# Patient Record
Sex: FEMALE | Race: WHITE | NOT HISPANIC OR LATINO | Employment: FULL TIME | URBAN - METROPOLITAN AREA
[De-identification: names, ages, dates, MRNs, and addresses within clinical notes are randomized per-mention and may not be internally consistent; named-entity substitution may affect disease eponyms.]

---

## 2023-09-19 PROBLEM — E66.9 OBESITY: Status: ACTIVE | Noted: 2023-09-19

## 2023-09-19 PROBLEM — M72.2 PLANTAR FASCIITIS, RIGHT: Status: ACTIVE | Noted: 2023-09-19

## 2023-09-19 PROBLEM — M79.671 MECHANICAL PAIN OF RIGHT FOOT: Status: ACTIVE | Noted: 2023-09-19

## 2023-09-19 PROBLEM — F41.9 ANXIETY: Status: ACTIVE | Noted: 2023-09-19

## 2023-09-20 ENCOUNTER — OFFICE VISIT (OUTPATIENT)
Dept: PRIMARY CARE | Facility: CLINIC | Age: 48
End: 2023-09-20
Payer: COMMERCIAL

## 2023-09-20 VITALS
RESPIRATION RATE: 16 BRPM | WEIGHT: 196.8 LBS | BODY MASS INDEX: 34.96 KG/M2 | SYSTOLIC BLOOD PRESSURE: 130 MMHG | OXYGEN SATURATION: 98 % | TEMPERATURE: 97.5 F | HEART RATE: 69 BPM | DIASTOLIC BLOOD PRESSURE: 80 MMHG

## 2023-09-20 DIAGNOSIS — Z00.00 HEALTHCARE MAINTENANCE: ICD-10-CM

## 2023-09-20 DIAGNOSIS — F41.9 ANXIETY: Primary | ICD-10-CM

## 2023-09-20 DIAGNOSIS — Z00.00 HEALTH MAINTENANCE EXAMINATION: ICD-10-CM

## 2023-09-20 PROCEDURE — 99213 OFFICE O/P EST LOW 20 MIN: CPT | Performed by: INTERNAL MEDICINE

## 2023-09-20 PROCEDURE — 1036F TOBACCO NON-USER: CPT | Performed by: INTERNAL MEDICINE

## 2023-09-20 RX ORDER — TALC
POWDER (GRAM) TOPICAL
COMMUNITY

## 2023-09-20 RX ORDER — CHOLECALCIFEROL (VITAMIN D3) 50 MCG
TABLET ORAL
COMMUNITY
Start: 2019-08-12

## 2023-09-20 RX ORDER — CALCIUM CARBONATE/VITAMIN D3 500-10/5ML
LIQUID (ML) ORAL
COMMUNITY
Start: 2019-08-12

## 2023-09-20 RX ORDER — HYDROXYZINE HYDROCHLORIDE 10 MG/1
TABLET, FILM COATED ORAL
Qty: 30 TABLET | Refills: 0 | Status: SHIPPED | OUTPATIENT
Start: 2023-09-20 | End: 2023-10-12 | Stop reason: SDUPTHER

## 2023-09-20 RX ORDER — BUPROPION HYDROCHLORIDE 150 MG/1
150 TABLET ORAL EVERY MORNING
Qty: 30 TABLET | Refills: 1 | Status: SHIPPED | OUTPATIENT
Start: 2023-09-20 | End: 2023-11-17 | Stop reason: SDUPTHER

## 2023-09-20 ASSESSMENT — PATIENT HEALTH QUESTIONNAIRE - PHQ9
1. LITTLE INTEREST OR PLEASURE IN DOING THINGS: NOT AT ALL
10. IF YOU CHECKED OFF ANY PROBLEMS, HOW DIFFICULT HAVE THESE PROBLEMS MADE IT FOR YOU TO DO YOUR WORK, TAKE CARE OF THINGS AT HOME, OR GET ALONG WITH OTHER PEOPLE: NOT DIFFICULT AT ALL
2. FEELING DOWN, DEPRESSED OR HOPELESS: SEVERAL DAYS
SUM OF ALL RESPONSES TO PHQ9 QUESTIONS 1 AND 2: 1

## 2023-09-20 ASSESSMENT — ENCOUNTER SYMPTOMS
PALPITATIONS: 0
ACTIVITY CHANGE: 0
COUGH: 0
APPETITE CHANGE: 0
SHORTNESS OF BREATH: 0

## 2023-09-20 NOTE — PROGRESS NOTES
"The patient is here today for anxiety.  The patient feels like she also has depression also and would like to discuss possibly starting Wellbutrin. The patient also has trouble staying asleep at night.  The patient is taking L-Theanine.  The patient declined to have the flu vaccine.Subjective   Patient ID: Mayra Tillman is a 48 y.o. female who presents for Anxiety.  Here with c/o anxiety    Stopped her citalopram because of decreased libido and \"not feeling right\"    Daughter diagnosed with OCD     Not sleeping - could not tolerate Trazodone  Can fall asleep but cannot stay asleep  Notfocusing  Gets to work no problem - a little more scattered   Tried OTC supplements  Some \"feeling down\"  Wants to try Wellbutrin'    Went to counseling in the past    Alcohol -  1-2 occassional  Marijuana - denies        Review of Systems   Constitutional:  Negative for activity change and appetite change.   Respiratory:  Negative for cough and shortness of breath.    Cardiovascular:  Negative for chest pain, palpitations and leg swelling.       Objective   Physical Exam  Vitals and nursing note reviewed.   Cardiovascular:      Rate and Rhythm: Normal rate and regular rhythm.   Pulmonary:      Effort: Pulmonary effort is normal.      Breath sounds: Normal breath sounds.         Assessment/Plan   Problem List Items Addressed This Visit       Anxiety - Primary    Current Assessment & Plan     Will try Wellbutrin XL 150mg daily and Hydorxyzine at hs         Relevant Medications    buPROPion XL (Wellbutrin XL) 150 mg 24 hr tablet    hydrOXYzine HCL (Atarax) 10 mg tablet    Other Relevant Orders    Follow Up In [facility]    Follow up with me in 4-6 weeks  "

## 2023-10-05 ENCOUNTER — APPOINTMENT (OUTPATIENT)
Dept: PRIMARY CARE | Facility: CLINIC | Age: 48
End: 2023-10-05
Payer: COMMERCIAL

## 2023-10-05 ENCOUNTER — SOCIAL WORK (OUTPATIENT)
Dept: PRIMARY CARE | Facility: CLINIC | Age: 48
End: 2023-10-05
Payer: COMMERCIAL

## 2023-10-05 DIAGNOSIS — F41.9 ANXIETY: ICD-10-CM
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
## 2023-10-10 ASSESSMENT — PATIENT HEALTH QUESTIONNAIRE - PHQ9
7. TROUBLE CONCENTRATING ON THINGS, SUCH AS READING THE NEWSPAPER OR WATCHING TELEVISION: MORE THAN HALF THE DAYS
4. FEELING TIRED OR HAVING LITTLE ENERGY: SEVERAL DAYS
5. POOR APPETITE OR OVEREATING: NOT AT ALL
SUM OF ALL RESPONSES TO PHQ9 QUESTIONS 1 & 2: 1
8. MOVING OR SPEAKING SO SLOWLY THAT OTHER PEOPLE COULD HAVE NOTICED. OR THE OPPOSITE, BEING SO FIGETY OR RESTLESS THAT YOU HAVE BEEN MOVING AROUND A LOT MORE THAN USUAL: NOT AT ALL
10. IF YOU CHECKED OFF ANY PROBLEMS, HOW DIFFICULT HAVE THESE PROBLEMS MADE IT FOR YOU TO DO YOUR WORK, TAKE CARE OF THINGS AT HOME, OR GET ALONG WITH OTHER PEOPLE: NOT DIFFICULT AT ALL
2. FEELING DOWN, DEPRESSED OR HOPELESS: SEVERAL DAYS
3. TROUBLE FALLING OR STAYING ASLEEP: NEARLY EVERY DAY
9. THOUGHTS THAT YOU WOULD BE BETTER OFF DEAD, OR OF HURTING YOURSELF: NOT AT ALL
SUM OF ALL RESPONSES TO PHQ QUESTIONS 1-9: 8
1. LITTLE INTEREST OR PLEASURE IN DOING THINGS: NOT AT ALL
6. FEELING BAD ABOUT YOURSELF - OR THAT YOU ARE A FAILURE OR HAVE LET YOURSELF OR YOUR FAMILY DOWN: SEVERAL DAYS

## 2023-10-10 ASSESSMENT — ANXIETY QUESTIONNAIRES
6. BECOMING EASILY ANNOYED OR IRRITABLE: SEVERAL DAYS
4. TROUBLE RELAXING: NOT AT ALL
GAD7 TOTAL SCORE: 6
2. NOT BEING ABLE TO STOP OR CONTROL WORRYING: SEVERAL DAYS
7. FEELING AFRAID AS IF SOMETHING AWFUL MIGHT HAPPEN: SEVERAL DAYS
5. BEING SO RESTLESS THAT IT IS HARD TO SIT STILL: NOT AT ALL
3. WORRYING TOO MUCH ABOUT DIFFERENT THINGS: SEVERAL DAYS
1. FEELING NERVOUS, ANXIOUS, OR ON EDGE: MORE THAN HALF THE DAYS
IF YOU CHECKED OFF ANY PROBLEMS ON THIS QUESTIONNAIRE, HOW DIFFICULT HAVE THESE PROBLEMS MADE IT FOR YOU TO DO YOUR WORK, TAKE CARE OF THINGS AT HOME, OR GET ALONG WITH OTHER PEOPLE: SOMEWHAT DIFFICULT

## 2023-10-12 ENCOUNTER — DOCUMENTATION (OUTPATIENT)
Dept: BEHAVIORAL HEALTH | Facility: CLINIC | Age: 48
End: 2023-10-12

## 2023-10-12 ENCOUNTER — SOCIAL WORK (OUTPATIENT)
Dept: PRIMARY CARE | Facility: CLINIC | Age: 48
End: 2023-10-12
Payer: COMMERCIAL

## 2023-10-12 DIAGNOSIS — F41.9 ANXIETY: ICD-10-CM

## 2023-10-12 PROBLEM — F41.1 GENERALIZED ANXIETY DISORDER: Status: ACTIVE | Noted: 2023-10-12

## 2023-10-12 RX ORDER — HYDROXYZINE HYDROCHLORIDE 10 MG/1
TABLET, FILM COATED ORAL
Qty: 60 TABLET | Refills: 1 | Status: SHIPPED | OUTPATIENT
Start: 2023-10-12 | End: 2023-11-20 | Stop reason: SDUPTHER

## 2023-10-12 NOTE — TELEPHONE ENCOUNTER
The patient was requesting a refill on the Hyrdroxyzine 10 mg 2 at bedtime. The patient states that she messaged you and you were ok with increasing the dose to two at bedtime. This was entered for approval

## 2023-10-12 NOTE — PROGRESS NOTES
"[facility] Psychiatry Consult Note     Mayra Tillman is a 48 y.o., referred to Collaborative Care for symptoms of depression and anxiety. I have reviewed the patient with the behavioral health manager and reviewed the patient's electronic record.    Recommendations:   - agree with Wellbutrin, which is helpful for mood consequences of anxiety - no need to titrate further  - For more specific treatment of anxiety itself, I still recommend SSRI/SNRI with temporary (2-4 week) augmentation with clonazepam (to prevent initial, short-term adverse effects of increased anxiety, as occurred with sertraline). Could try this with sertraline again, or switch to an SNRI such as duloxetine.  - clonazepam dose would be 0.25-0.5 mg bid for 2-4 weeks, no taper needed with that duration  - may titrate hydroxyzine up to 50 mg if desired for more help with insomnia (don't titrate at the same time as the clonazepam treatment, though)  - [facility] will do mindfulness & exposures for anxiety  - M will help with insomnia: sleep hygiene, behavioral techniques, relaxation, mindfulness    Diagnoses:   Generalized Anxiety Disorder  Persistent Depressive Disorder 2/2 anxiety  Insomnia due to anxiety    Wellbutrin  mg daily helped improve mood, fatigue, focus/impulsivity, and irritability  Hydroxyzine 20 mg nightly for insomnia - partial improvement; joint pain also interferes  Past meds:  Citalopram 30 mg in the past unhelpful, \"didn't feel right,\" sexual Aes  Trazodone was associated with severe joint pain and swelling  Clonazepam - helpful for anxiety, no AEs  Zoloft 50 mg for 1 week caused panic attacks    Patient Health Questionnaire-9 Score: 8 (10/10/2023 12:21 PM)  BOBBY-7 Total Score: 6 (10/10/2023 12:22 PM)    Sertraline (Zoloft)  DOSING INFORMATION:   Consider BMP for baseline sodium in older adults.   Start at 25 mg daily. If well-tolerated after 1 week, increase to 50 mg daily.  Titrate to effect by 25-50 mg every two weeks, to a " maximum dose of 200 mg daily.  Discontinuation: 25% per week to 25% per month depending on length of treatment in order to minimize withdrawal symptoms and relapse.  OF NOTE: False-positive urine immunoassay screening tests for benzodiazepines have been reported in patients taking sertraline.  GENERAL INFORMATION:   Mechanism of Action: Selective serotonin reuptake inhibitor.   FDA Indications: MDD, OCD, panic disorder, PTSD, social phobia, PMDD.   Off-Label Indications: Other anxiety.   Pharmacokinetics: T½ = 26 hrs.   Common Side effects (MDD): Nausea (26%), diarrhea (18%), dry mouth (16%), insomnia (16%), somnolence (13%), dizziness (12%), tremor (11%), fatigue (11%), increased sweating, (8%), ejaculation failure (7%).   Reproductive potential/pregnancy/lactation: Usual first-line SSRI antidepressant during pregnancy and lactation.  Black Box Warning: Increased SI in patients < 26 y/o. This is based on initial pharmaceutical studies and has not been borne out in subsequent meta-analyses.   Rare/Serious Adverse Effects: Clinical worsening and suicide risk, hypomanic/manic switch, serotonin symptoms, weight loss, seizure, discontinuation symptoms, abnormal bleeding, altered platelet function, hyponatremia, weak uricosuric effect, angle closure glaucoma.   Contraindications: Use of a MAOI within 4 weeks; Concomitant use with pimozide.     Duloxetine (Cymbalta)  ==================  DOSING INFORMATION  Week 1: Baseline weight and blood pressure. BMP for baseline sodium in older adults. Start: 30 mg qday.  Week 2: Increase dose to the Initial and Typical Target Dose of 60 mg qday or 30 mg bid, if tolerated.   Max Dose: 120 mg qday (little evidence that higher doses are beneficial).   Discontinuation: 25% per week to 25% per month depending on length of treatment in order to minimize withdrawal symptoms and relapse.  MONITORING: Blood pressure, weight. Posttreatment BMP to rule out hyponatremia in older adults.  GENERAL  INFORMATION:   Mechanism of Action: Serotonin/Norepinephrine Reuptake Inhibitor (SNRI).   FDA Indications: MDD, BOBBY, diabetic peripheral neuropathic pain, fibromyalgia; chronic musculoskeletal pain.   Off-Label Indications: Second-line ADHD, other pain, other anxiety.  Pharmacokinetics: T½ = 12 hrs.   Common Side effects (MDD & BOBBY): nausea (25%), dry mouth (15%), diarrhea (10%), constipation (10%), fatigue (10%), dizziness (10%), somnolence (10%), insomnia (10%), decreased appetite (7%), hyperhidrosis (6%), vomiting (5%), agitation (5%).  Black Box Warning: Increased SI in patients < 24 y/o.   Contraindications:Use of a MAOI within 14 days of stopping Cymbalta, concurrent use of a MAOI including drugs with significant MAOI activity (e.g., linezolid), use of Cymbalta within 14 days of stopping a MAOI, use in patients with uncontrolled narrow angle glaucoma. Warnings and Precautions: Suicidality, hepatotoxicity (should not be prescribed in patients with substantial alcohol use or evidence of chronic liver disease), orthostatic hypotension and syncope, serotonin syndrome, abnormal bleeding, severe skin reactions, discontinuation symptoms, manic switch, seizures, increased BP, use with 1A2 inhibitors or thioridazine, hyponatremia, hepatic insufficiency and severe renal impairment, use caution in patient with controlled narrow-angle glaucoma and with slow gastric emptying, elevation in fasting blood glucose and HbA1C, urinary hesitance and retention. Metabolism/ Pharmacogenomics: Metabolized by 1A2 and 2D6.   Significant drug-drug interactions: 2D6 inhibitor. Avoid co-administration with potent 1A2 inhibitors (e.g., fluvoxamine); and use cautiously with 2D6 inhibitors (e.g., fluoxetine and paroxetine). Potential for abnormal bleeding with NSAIDs or anticoagulants; Check all drug-drug interactions before prescribing.   Dosage Form: Capsule (Do not cut, crush or chew).      The above treatment considerations and  suggestions are based on consultations with the patient's care manager and a review of information available in the electronic medical record. I have not personally examined the patient. All recommendations should be implemented with consideration of the patient's relevant prior history and current clinical status. Please feel free to call me with any questions about the care of this patient. I can easily be reached through State chat.

## 2023-10-13 NOTE — PROGRESS NOTES
[facility]  Progress Note    Type of Interaction: Virtual    Start Time: 12:00 PM    End Time: 12:52 PM      Appointment: Scheduled    Reason for Visit:   Chief Complaint   Patient presents with    Anxiety    Follow-up       Interval History / Patient Symptoms:     Patient Health Questionnaire-9 Score: 8 (10/10/2023 12:21 PM)  BOBBY-7 Total Score: 6 (10/10/2023 12:22 PM)    [facility] met with pt for scheduled virtual session. Pt reported she has been sleeping somewhat better since starting Wellbutrin and Hydroxyzine. Pt reported she takes Hydroxyzine nightly to help her sleep and has been getting between 4-6 hours per night. BHM and pt discussed proper sleep hygiene techniques. Pt reported Wellbutrin has been helpful for managing mood irritability and impulse control. Pt processed negative thoughts impacting her behavior. [facility] provided pt coping strategies for managing irritability.     Interventions Provided: Psychoeducation and Develop Coping Strategies      Progress Made: Minimum    Response to Intervention: Pt was engaged throughout session and was receptive towards [facility] feedback and interventions provided.     Plan:     -Pt will utilize coping strategies learned in session in her daily routine  -Pt will utilize sleep hygiene tips reviewed in session in her daily routine  -Follow up with [facility] in 2 weeks.    Follow Up / Next Appointment: Next appointment: 10/27/23

## 2023-10-27 ENCOUNTER — SOCIAL WORK (OUTPATIENT)
Dept: PRIMARY CARE | Facility: CLINIC | Age: 48
End: 2023-10-27
Payer: COMMERCIAL

## 2023-10-27 ENCOUNTER — APPOINTMENT (OUTPATIENT)
Dept: PRIMARY CARE | Facility: CLINIC | Age: 48
End: 2023-10-27
Payer: COMMERCIAL

## 2023-10-27 ASSESSMENT — PATIENT HEALTH QUESTIONNAIRE - PHQ9
3. TROUBLE FALLING OR STAYING ASLEEP: SEVERAL DAYS
8. MOVING OR SPEAKING SO SLOWLY THAT OTHER PEOPLE COULD HAVE NOTICED. OR THE OPPOSITE, BEING SO FIGETY OR RESTLESS THAT YOU HAVE BEEN MOVING AROUND A LOT MORE THAN USUAL: NOT AT ALL
7. TROUBLE CONCENTRATING ON THINGS, SUCH AS READING THE NEWSPAPER OR WATCHING TELEVISION: NOT AT ALL
9. THOUGHTS THAT YOU WOULD BE BETTER OFF DEAD, OR OF HURTING YOURSELF: NOT AT ALL
SUM OF ALL RESPONSES TO PHQ QUESTIONS 1-9: 2
SUM OF ALL RESPONSES TO PHQ9 QUESTIONS 1 & 2: 0
10. IF YOU CHECKED OFF ANY PROBLEMS, HOW DIFFICULT HAVE THESE PROBLEMS MADE IT FOR YOU TO DO YOUR WORK, TAKE CARE OF THINGS AT HOME, OR GET ALONG WITH OTHER PEOPLE: NOT DIFFICULT AT ALL
6. FEELING BAD ABOUT YOURSELF - OR THAT YOU ARE A FAILURE OR HAVE LET YOURSELF OR YOUR FAMILY DOWN: NOT AT ALL
5. POOR APPETITE OR OVEREATING: NOT AT ALL
4. FEELING TIRED OR HAVING LITTLE ENERGY: SEVERAL DAYS
1. LITTLE INTEREST OR PLEASURE IN DOING THINGS: NOT AT ALL
2. FEELING DOWN, DEPRESSED OR HOPELESS: NOT AT ALL

## 2023-10-27 ASSESSMENT — ANXIETY QUESTIONNAIRES
5. BEING SO RESTLESS THAT IT IS HARD TO SIT STILL: NOT AT ALL
3. WORRYING TOO MUCH ABOUT DIFFERENT THINGS: NOT AT ALL
7. FEELING AFRAID AS IF SOMETHING AWFUL MIGHT HAPPEN: NOT AT ALL
IF YOU CHECKED OFF ANY PROBLEMS ON THIS QUESTIONNAIRE, HOW DIFFICULT HAVE THESE PROBLEMS MADE IT FOR YOU TO DO YOUR WORK, TAKE CARE OF THINGS AT HOME, OR GET ALONG WITH OTHER PEOPLE: NOT DIFFICULT AT ALL
2. NOT BEING ABLE TO STOP OR CONTROL WORRYING: SEVERAL DAYS
4. TROUBLE RELAXING: NOT AT ALL
1. FEELING NERVOUS, ANXIOUS, OR ON EDGE: SEVERAL DAYS
6. BECOMING EASILY ANNOYED OR IRRITABLE: NOT AT ALL
GAD7 TOTAL SCORE: 2

## 2023-10-27 NOTE — PROGRESS NOTES
Collaborative Care ([facility])  Progress Note    Type of Interaction: Virtual    Start Time: 12:06 PM    End Time: 1:01 PM      Appointment: Scheduled    Reason for Visit:   Chief Complaint   Patient presents with    Anxiety    Follow-up       Interval History / Patient Symptoms:     Patient Health Questionnaire-9 Score: 4 (10/27/2023  3:12 PM)  BOBBY-7 Total Score: 5 (10/27/2023  3:12 PM)    [facility] met with pt for scheduled virtual session. Pt reported improvement with anxiety symptoms. Pt reported her sleep has improved as well. Pt reported she has had 2 nights within the past 2 weeks where she woke up several times throughout the night. Pt reported work is busier lately and she has had more responsibilities. Pt processed challenges regarding her children and negative thoughts she experiences about their future, etc. [facility] provided supportive psychotherapy and feedback. [facility] engaged pt in mindfulness techniques progressive muscle relaxation and body scanning to teach pt effective coping strategies for managing stress/ anxiety.     Interventions Provided: Psychoeducation, Develop Coping Strategies, and Mindfulness      Progress Made: Minimum    Response to Intervention: Pt was engaged throughout session and was receptive towards [facility] feedback and interventions provided.     Plan:     -Pt will utilize coping strategies learned in session in her daily routine  -Follow up with [facility] in 3 weeks    Follow Up / Next Appointment: Next appointment: 11/17/23

## 2023-11-03 ENCOUNTER — DOCUMENTATION (OUTPATIENT)
Dept: PRIMARY CARE | Facility: CLINIC | Age: 48
End: 2023-11-03
Payer: COMMERCIAL

## 2023-11-03 DIAGNOSIS — F41.1 GENERALIZED ANXIETY DISORDER: Primary | ICD-10-CM

## 2023-11-03 PROCEDURE — 99492 1ST PSYC COLLAB CARE MGMT: CPT | Performed by: INTERNAL MEDICINE

## 2023-11-03 PROCEDURE — 99494 1ST/SBSQ PSYC COLLAB CARE: CPT | Performed by: INTERNAL MEDICINE

## 2023-11-06 ENCOUNTER — OFFICE VISIT (OUTPATIENT)
Dept: PRIMARY CARE | Facility: CLINIC | Age: 48
End: 2023-11-06
Payer: COMMERCIAL

## 2023-11-06 VITALS
BODY MASS INDEX: 34.94 KG/M2 | SYSTOLIC BLOOD PRESSURE: 120 MMHG | RESPIRATION RATE: 16 BRPM | WEIGHT: 197.2 LBS | HEART RATE: 76 BPM | OXYGEN SATURATION: 97 % | TEMPERATURE: 96.8 F | HEIGHT: 63 IN | DIASTOLIC BLOOD PRESSURE: 86 MMHG

## 2023-11-06 DIAGNOSIS — E66.09 CLASS 1 OBESITY DUE TO EXCESS CALORIES WITHOUT SERIOUS COMORBIDITY WITH BODY MASS INDEX (BMI) OF 34.0 TO 34.9 IN ADULT: ICD-10-CM

## 2023-11-06 DIAGNOSIS — Z12.31 ENCOUNTER FOR SCREENING MAMMOGRAM FOR MALIGNANT NEOPLASM OF BREAST: ICD-10-CM

## 2023-11-06 DIAGNOSIS — F41.1 GENERALIZED ANXIETY DISORDER: Primary | ICD-10-CM

## 2023-11-06 PROBLEM — E66.811 CLASS 1 OBESITY DUE TO EXCESS CALORIES WITHOUT SERIOUS COMORBIDITY WITH BODY MASS INDEX (BMI) OF 34.0 TO 34.9 IN ADULT: Status: ACTIVE | Noted: 2023-09-19

## 2023-11-06 PROCEDURE — 3008F BODY MASS INDEX DOCD: CPT | Performed by: INTERNAL MEDICINE

## 2023-11-06 PROCEDURE — 1036F TOBACCO NON-USER: CPT | Performed by: INTERNAL MEDICINE

## 2023-11-06 PROCEDURE — 99396 PREV VISIT EST AGE 40-64: CPT | Performed by: INTERNAL MEDICINE

## 2023-11-06 ASSESSMENT — ENCOUNTER SYMPTOMS
TREMORS: 0
TROUBLE SWALLOWING: 0
HEMATURIA: 0
LIGHT-HEADEDNESS: 0
ACTIVITY CHANGE: 0
HEADACHES: 0
NAUSEA: 0
BLOOD IN STOOL: 0
PALPITATIONS: 0
FATIGUE: 0
VOMITING: 0
VOICE CHANGE: 0
DIFFICULTY URINATING: 0
CONSTIPATION: 0
DIZZINESS: 0
DIARRHEA: 0
APPETITE CHANGE: 0
UNEXPECTED WEIGHT CHANGE: 0
ARTHRALGIAS: 0
CHEST TIGHTNESS: 0
COUGH: 0
WHEEZING: 0

## 2023-11-06 ASSESSMENT — PATIENT HEALTH QUESTIONNAIRE - PHQ9
1. LITTLE INTEREST OR PLEASURE IN DOING THINGS: NOT AT ALL
SUM OF ALL RESPONSES TO PHQ9 QUESTIONS 1 AND 2: 0
2. FEELING DOWN, DEPRESSED OR HOPELESS: NOT AT ALL

## 2023-11-06 NOTE — PROGRESS NOTES
The patient is here today for a physical exam.Subjective   Patient ID: Mayra Tillman is a 48 y.o. female who presents for Annual Exam.  47 yo here for physical  Feels much better on Wellbutrin  Less anxiety  Takes Hydroxyzine every night - takes 2 pills  Works most of the time    Taking Melatonin at night        Review of Systems   Constitutional:  Negative for activity change, appetite change, fatigue and unexpected weight change.   HENT:  Negative for ear pain, hearing loss, tinnitus, trouble swallowing and voice change.    Eyes:  Negative for visual disturbance.   Respiratory:  Negative for cough, chest tightness and wheezing.    Cardiovascular:  Negative for chest pain, palpitations and leg swelling.   Gastrointestinal:  Negative for blood in stool, constipation, diarrhea, nausea and vomiting.   Genitourinary:  Negative for difficulty urinating, hematuria and vaginal bleeding.   Musculoskeletal:  Negative for arthralgias.   Skin:  Negative for rash.   Neurological:  Negative for dizziness, tremors, syncope, light-headedness and headaches.       Objective   Physical Exam  Constitutional:       General: She is not in acute distress.     Appearance: She is well-developed. She is not diaphoretic.   HENT:      Head: Normocephalic.      Right Ear: Tympanic membrane normal. There is no impacted cerumen.      Left Ear: Tympanic membrane normal. There is no impacted cerumen.      Nose: Nose normal.      Mouth/Throat:      Mouth: Mucous membranes are moist.      Pharynx: Oropharynx is clear. No oropharyngeal exudate or posterior oropharyngeal erythema.   Eyes:      General: No scleral icterus.     Extraocular Movements: Extraocular movements intact.      Conjunctiva/sclera: Conjunctivae normal.      Pupils: Pupils are equal, round, and reactive to light.   Neck:      Thyroid: No thyromegaly.      Vascular: No JVD.   Cardiovascular:      Rate and Rhythm: Normal rate and regular rhythm.      Pulses: Normal pulses.       Heart sounds: Normal heart sounds. No murmur heard.     No friction rub. No gallop.   Pulmonary:      Effort: Pulmonary effort is normal. No respiratory distress.      Breath sounds: Normal breath sounds. No wheezing or rales.   Chest:      Chest wall: No tenderness.   Abdominal:      General: Bowel sounds are normal. There is no distension.      Palpations: Abdomen is soft. There is no mass.      Tenderness: There is no abdominal tenderness. There is no rebound.   Musculoskeletal:         General: Normal range of motion.      Cervical back: Normal range of motion and neck supple.   Lymphadenopathy:      Cervical: No cervical adenopathy.   Skin:     General: Skin is warm and dry.   Neurological:      General: No focal deficit present.      Mental Status: She is alert and oriented to person, place, and time.      Deep Tendon Reflexes: Reflexes normal.   Psychiatric:         Mood and Affect: Mood normal.         Thought Content: Thought content normal.         Assessment/Plan   Problem List Items Addressed This Visit       Class 1 obesity due to excess calories without serious comorbidity with body mass index (BMI) of 34.0 to 34.9 in adult    Current Assessment & Plan     Discussed diet and exercise         Generalized anxiety disorder - Primary    Current Assessment & Plan     Stable          Other Visit Diagnoses       Encounter for screening mammogram for malignant neoplasm of breast        Relevant Orders    BI mammo bilateral screening tomosynthesis         Follow up with me in 6 months

## 2023-11-17 ENCOUNTER — SOCIAL WORK (OUTPATIENT)
Dept: PRIMARY CARE | Facility: CLINIC | Age: 48
End: 2023-11-17
Payer: COMMERCIAL

## 2023-11-17 DIAGNOSIS — F41.9 ANXIETY: ICD-10-CM

## 2023-11-17 RX ORDER — BUPROPION HYDROCHLORIDE 150 MG/1
150 TABLET ORAL EVERY MORNING
Qty: 30 TABLET | Refills: 1 | Status: SHIPPED | OUTPATIENT
Start: 2023-11-17 | End: 2023-12-20

## 2023-11-17 ASSESSMENT — ANXIETY QUESTIONNAIRES
2. NOT BEING ABLE TO STOP OR CONTROL WORRYING: SEVERAL DAYS
IF YOU CHECKED OFF ANY PROBLEMS ON THIS QUESTIONNAIRE, HOW DIFFICULT HAVE THESE PROBLEMS MADE IT FOR YOU TO DO YOUR WORK, TAKE CARE OF THINGS AT HOME, OR GET ALONG WITH OTHER PEOPLE: NOT DIFFICULT AT ALL
GAD7 TOTAL SCORE: 2
6. BECOMING EASILY ANNOYED OR IRRITABLE: SEVERAL DAYS
7. FEELING AFRAID AS IF SOMETHING AWFUL MIGHT HAPPEN: NOT AT ALL
1. FEELING NERVOUS, ANXIOUS, OR ON EDGE: NOT AT ALL
5. BEING SO RESTLESS THAT IT IS HARD TO SIT STILL: NOT AT ALL
3. WORRYING TOO MUCH ABOUT DIFFERENT THINGS: NOT AT ALL
4. TROUBLE RELAXING: NOT AT ALL

## 2023-11-17 ASSESSMENT — PATIENT HEALTH QUESTIONNAIRE - PHQ9
7. TROUBLE CONCENTRATING ON THINGS, SUCH AS READING THE NEWSPAPER OR WATCHING TELEVISION: NOT AT ALL
4. FEELING TIRED OR HAVING LITTLE ENERGY: NOT AT ALL
2. FEELING DOWN, DEPRESSED OR HOPELESS: NOT AT ALL
SUM OF ALL RESPONSES TO PHQ9 QUESTIONS 1 & 2: 0
9. THOUGHTS THAT YOU WOULD BE BETTER OFF DEAD, OR OF HURTING YOURSELF: NOT AT ALL
8. MOVING OR SPEAKING SO SLOWLY THAT OTHER PEOPLE COULD HAVE NOTICED. OR THE OPPOSITE, BEING SO FIGETY OR RESTLESS THAT YOU HAVE BEEN MOVING AROUND A LOT MORE THAN USUAL: NOT AT ALL
10. IF YOU CHECKED OFF ANY PROBLEMS, HOW DIFFICULT HAVE THESE PROBLEMS MADE IT FOR YOU TO DO YOUR WORK, TAKE CARE OF THINGS AT HOME, OR GET ALONG WITH OTHER PEOPLE: NOT DIFFICULT AT ALL
5. POOR APPETITE OR OVEREATING: NOT AT ALL
1. LITTLE INTEREST OR PLEASURE IN DOING THINGS: NOT AT ALL
3. TROUBLE FALLING OR STAYING ASLEEP: SEVERAL DAYS
SUM OF ALL RESPONSES TO PHQ QUESTIONS 1-9: 1
6. FEELING BAD ABOUT YOURSELF - OR THAT YOU ARE A FAILURE OR HAVE LET YOURSELF OR YOUR FAMILY DOWN: NOT AT ALL

## 2023-11-20 DIAGNOSIS — F41.9 ANXIETY: ICD-10-CM

## 2023-11-20 RX ORDER — HYDROXYZINE HYDROCHLORIDE 10 MG/1
TABLET, FILM COATED ORAL
Qty: 60 TABLET | Refills: 1 | Status: SHIPPED | OUTPATIENT
Start: 2023-11-20 | End: 2024-05-08 | Stop reason: SINTOL

## 2023-12-04 ENCOUNTER — DOCUMENTATION (OUTPATIENT)
Dept: PRIMARY CARE | Facility: CLINIC | Age: 48
End: 2023-12-04
Payer: COMMERCIAL

## 2023-12-04 DIAGNOSIS — F41.1 GENERALIZED ANXIETY DISORDER: Primary | ICD-10-CM

## 2023-12-04 PROCEDURE — 99493 SBSQ PSYC COLLAB CARE MGMT: CPT

## 2023-12-20 DIAGNOSIS — F41.9 ANXIETY: ICD-10-CM

## 2023-12-20 RX ORDER — BUPROPION HYDROCHLORIDE 150 MG/1
150 TABLET ORAL DAILY
Qty: 90 TABLET | Refills: 1 | Status: SHIPPED | OUTPATIENT
Start: 2023-12-20

## 2024-01-05 ENCOUNTER — APPOINTMENT (OUTPATIENT)
Dept: PRIMARY CARE | Facility: CLINIC | Age: 49
End: 2024-01-05
Payer: COMMERCIAL

## 2024-01-17 ENCOUNTER — LAB (OUTPATIENT)
Dept: LAB | Facility: LAB | Age: 49
End: 2024-01-17
Payer: COMMERCIAL

## 2024-01-17 DIAGNOSIS — Z00.00 HEALTHCARE MAINTENANCE: ICD-10-CM

## 2024-01-17 DIAGNOSIS — Z00.00 HEALTH MAINTENANCE EXAMINATION: ICD-10-CM

## 2024-01-17 LAB
ALBUMIN SERPL BCP-MCNC: 4.4 G/DL (ref 3.4–5)
ALP SERPL-CCNC: 63 U/L (ref 33–110)
ALT SERPL W P-5'-P-CCNC: 10 U/L (ref 7–45)
ANION GAP SERPL CALC-SCNC: 12 MMOL/L (ref 10–20)
AST SERPL W P-5'-P-CCNC: 16 U/L (ref 9–39)
BILIRUB SERPL-MCNC: 0.5 MG/DL (ref 0–1.2)
BUN SERPL-MCNC: 13 MG/DL (ref 6–23)
CALCIUM SERPL-MCNC: 9.1 MG/DL (ref 8.6–10.3)
CHLORIDE SERPL-SCNC: 105 MMOL/L (ref 98–107)
CHOLEST SERPL-MCNC: 220 MG/DL (ref 0–199)
CHOLESTEROL/HDL RATIO: 4.6
CO2 SERPL-SCNC: 29 MMOL/L (ref 21–32)
CREAT SERPL-MCNC: 0.86 MG/DL (ref 0.5–1.05)
EGFRCR SERPLBLD CKD-EPI 2021: 83 ML/MIN/1.73M*2
ERYTHROCYTE [DISTWIDTH] IN BLOOD BY AUTOMATED COUNT: 11.6 % (ref 11.5–14.5)
GLUCOSE SERPL-MCNC: 84 MG/DL (ref 74–99)
HCT VFR BLD AUTO: 40.1 % (ref 36–46)
HDLC SERPL-MCNC: 48.3 MG/DL
HGB BLD-MCNC: 13 G/DL (ref 12–16)
LDLC SERPL CALC-MCNC: 152 MG/DL
MCH RBC QN AUTO: 31.6 PG (ref 26–34)
MCHC RBC AUTO-ENTMCNC: 32.4 G/DL (ref 32–36)
MCV RBC AUTO: 98 FL (ref 80–100)
NON HDL CHOLESTEROL: 172 MG/DL (ref 0–149)
NRBC BLD-RTO: 0 /100 WBCS (ref 0–0)
PLATELET # BLD AUTO: 282 X10*3/UL (ref 150–450)
POTASSIUM SERPL-SCNC: 4.1 MMOL/L (ref 3.5–5.3)
PROT SERPL-MCNC: 7.3 G/DL (ref 6.4–8.2)
RBC # BLD AUTO: 4.11 X10*6/UL (ref 4–5.2)
SODIUM SERPL-SCNC: 142 MMOL/L (ref 136–145)
TRIGL SERPL-MCNC: 97 MG/DL (ref 0–149)
TSH SERPL-ACNC: 2.65 MIU/L (ref 0.44–3.98)
VLDL: 19 MG/DL (ref 0–40)
WBC # BLD AUTO: 5.1 X10*3/UL (ref 4.4–11.3)

## 2024-01-17 PROCEDURE — 80053 COMPREHEN METABOLIC PANEL: CPT

## 2024-01-17 PROCEDURE — 80061 LIPID PANEL: CPT

## 2024-01-17 PROCEDURE — 36415 COLL VENOUS BLD VENIPUNCTURE: CPT

## 2024-01-17 PROCEDURE — 84443 ASSAY THYROID STIM HORMONE: CPT

## 2024-01-17 PROCEDURE — 85027 COMPLETE CBC AUTOMATED: CPT

## 2024-01-19 ENCOUNTER — TELEPHONE (OUTPATIENT)
Dept: PRIMARY CARE | Facility: CLINIC | Age: 49
End: 2024-01-19
Payer: COMMERCIAL

## 2024-03-14 ENCOUNTER — HOSPITAL ENCOUNTER (OUTPATIENT)
Dept: RADIOLOGY | Facility: CLINIC | Age: 49
Discharge: HOME | End: 2024-03-14
Payer: COMMERCIAL

## 2024-03-14 VITALS — BODY MASS INDEX: 34.73 KG/M2 | HEIGHT: 63 IN | WEIGHT: 196 LBS

## 2024-03-14 DIAGNOSIS — Z12.31 ENCOUNTER FOR SCREENING MAMMOGRAM FOR MALIGNANT NEOPLASM OF BREAST: ICD-10-CM

## 2024-03-14 PROCEDURE — 77067 SCR MAMMO BI INCL CAD: CPT

## 2024-03-14 PROCEDURE — 77063 BREAST TOMOSYNTHESIS BI: CPT | Performed by: RADIOLOGY

## 2024-03-14 PROCEDURE — 77067 SCR MAMMO BI INCL CAD: CPT | Performed by: RADIOLOGY

## 2024-03-19 NOTE — RESULT ENCOUNTER NOTE
It's Dr. Raul Corbett, covering for Dr. London    Thank you for doing the annual mammogram. The radiologist reports no worrisome findings. Please continue monthly self breast exams, as well as annual mammograms. Please contact me with any concerns.     Sincerely,    Raul Corbett MD   - covering physician for Dr. London

## 2024-05-06 PROBLEM — F41.9 ANXIETY: Status: ACTIVE | Noted: 2023-09-19

## 2024-05-06 PROBLEM — J01.90 ACUTE SINUSITIS: Status: ACTIVE | Noted: 2022-11-16

## 2024-05-06 PROBLEM — M72.2 PLANTAR FASCIITIS, RIGHT: Status: RESOLVED | Noted: 2023-09-19 | Resolved: 2024-05-06

## 2024-05-06 PROBLEM — R05.1 ACUTE COUGH: Status: ACTIVE | Noted: 2022-11-16

## 2024-05-06 PROBLEM — M79.671 MECHANICAL PAIN OF RIGHT FOOT: Status: RESOLVED | Noted: 2023-09-19 | Resolved: 2024-05-06

## 2024-05-06 PROBLEM — G47.00 INSOMNIA: Status: ACTIVE | Noted: 2024-05-06

## 2024-05-08 ENCOUNTER — OFFICE VISIT (OUTPATIENT)
Dept: PRIMARY CARE | Facility: CLINIC | Age: 49
End: 2024-05-08
Payer: COMMERCIAL

## 2024-05-08 VITALS
OXYGEN SATURATION: 98 % | BODY MASS INDEX: 33.35 KG/M2 | HEIGHT: 63 IN | HEART RATE: 82 BPM | DIASTOLIC BLOOD PRESSURE: 74 MMHG | WEIGHT: 188.2 LBS | SYSTOLIC BLOOD PRESSURE: 109 MMHG | TEMPERATURE: 97.4 F

## 2024-05-08 DIAGNOSIS — N95.1 MENOPAUSAL SYMPTOM: ICD-10-CM

## 2024-05-08 DIAGNOSIS — F41.1 GENERALIZED ANXIETY DISORDER: Primary | ICD-10-CM

## 2024-05-08 PROBLEM — J01.90 ACUTE SINUSITIS: Status: RESOLVED | Noted: 2022-11-16 | Resolved: 2024-05-08

## 2024-05-08 PROCEDURE — 3008F BODY MASS INDEX DOCD: CPT | Performed by: INTERNAL MEDICINE

## 2024-05-08 PROCEDURE — 1036F TOBACCO NON-USER: CPT | Performed by: INTERNAL MEDICINE

## 2024-05-08 PROCEDURE — 99214 OFFICE O/P EST MOD 30 MIN: CPT | Performed by: INTERNAL MEDICINE

## 2024-05-08 RX ORDER — ESTRADIOL 0.5 MG/1
0.5 TABLET ORAL DAILY
Qty: 90 TABLET | Refills: 3 | Status: SHIPPED | OUTPATIENT
Start: 2024-05-08 | End: 2025-05-08

## 2024-05-08 ASSESSMENT — ENCOUNTER SYMPTOMS
PALPITATIONS: 0
ACTIVITY CHANGE: 0
SHORTNESS OF BREATH: 0
COUGH: 0
APPETITE CHANGE: 0

## 2024-05-08 ASSESSMENT — PATIENT HEALTH QUESTIONNAIRE - PHQ9
2. FEELING DOWN, DEPRESSED OR HOPELESS: NOT AT ALL
SUM OF ALL RESPONSES TO PHQ9 QUESTIONS 1 AND 2: 0
1. LITTLE INTEREST OR PLEASURE IN DOING THINGS: NOT AT ALL

## 2024-05-08 NOTE — ASSESSMENT & PLAN NOTE
Discussed pros and cons of HRT  She has had a hysterectomy  Will start Estradiol 0.5mg daily  Side effects discussed

## 2024-05-08 NOTE — PROGRESS NOTES
Subjective   Patient ID: Mayra Tillman is a 49 y.o. female who presents for Follow-up.  Feels well  Has lost 8lbs    Has more hot flashes, hair is falling out, not sleeping well despite hydroxyzine  No night sweats  Had a hysterectomy at age 40 for menometorrhagia  Increased nocturia  Decreased libido    Trazodone caused joint pain          Review of Systems   Constitutional:  Negative for activity change and appetite change.   Respiratory:  Negative for cough and shortness of breath.    Cardiovascular:  Negative for chest pain, palpitations and leg swelling.       Objective   Vitals:    05/08/24 0831   BP: 109/74   Pulse: 82   Temp: 36.3 °C (97.4 °F)   SpO2: 98%     Physical Exam  Vitals and nursing note reviewed.   Cardiovascular:      Rate and Rhythm: Normal rate and regular rhythm.   Pulmonary:      Effort: Pulmonary effort is normal.      Breath sounds: Normal breath sounds.         Assessment/Plan   Problem List Items Addressed This Visit       Generalized anxiety disorder - Primary    Current Assessment & Plan     Improved         Menopausal symptom    Current Assessment & Plan     Discussed pros and cons of HRT  She has had a hysterectomy  Will start Estradiol 0.5mg daily  Side effects discussed            Insomnia - wants to holf odd on Temezapam for now  Follow up with me in 3 months

## 2024-07-01 DIAGNOSIS — F41.9 ANXIETY: ICD-10-CM

## 2024-07-01 DIAGNOSIS — N95.1 MENOPAUSAL SYMPTOM: ICD-10-CM

## 2024-07-01 RX ORDER — ESTRADIOL 0.5 MG/1
0.5 TABLET ORAL DAILY
Qty: 90 TABLET | Refills: 3 | Status: SHIPPED | OUTPATIENT
Start: 2024-07-01 | End: 2025-07-01

## 2024-07-01 RX ORDER — BUPROPION HYDROCHLORIDE 150 MG/1
150 TABLET ORAL DAILY
Qty: 90 TABLET | Refills: 1 | Status: SHIPPED | OUTPATIENT
Start: 2024-07-01

## 2024-08-12 ENCOUNTER — APPOINTMENT (OUTPATIENT)
Dept: PRIMARY CARE | Facility: CLINIC | Age: 49
End: 2024-08-12
Payer: COMMERCIAL

## 2024-08-12 VITALS
DIASTOLIC BLOOD PRESSURE: 70 MMHG | WEIGHT: 194.2 LBS | BODY MASS INDEX: 34.41 KG/M2 | OXYGEN SATURATION: 97 % | HEIGHT: 63 IN | HEART RATE: 78 BPM | SYSTOLIC BLOOD PRESSURE: 118 MMHG

## 2024-08-12 DIAGNOSIS — F51.01 PRIMARY INSOMNIA: ICD-10-CM

## 2024-08-12 DIAGNOSIS — R92.30 DENSE BREAST: Primary | ICD-10-CM

## 2024-08-12 DIAGNOSIS — F41.1 GENERALIZED ANXIETY DISORDER: ICD-10-CM

## 2024-08-12 DIAGNOSIS — R92.2 DENSE BREAST: Primary | ICD-10-CM

## 2024-08-12 PROBLEM — R05.1 ACUTE COUGH: Status: RESOLVED | Noted: 2022-11-16 | Resolved: 2024-08-12

## 2024-08-12 PROBLEM — F41.9 ANXIETY: Status: RESOLVED | Noted: 2023-09-19 | Resolved: 2024-08-12

## 2024-08-12 PROCEDURE — 3008F BODY MASS INDEX DOCD: CPT | Performed by: INTERNAL MEDICINE

## 2024-08-12 PROCEDURE — 99214 OFFICE O/P EST MOD 30 MIN: CPT | Performed by: INTERNAL MEDICINE

## 2024-08-12 RX ORDER — ZOLPIDEM TARTRATE 5 MG/1
5 TABLET ORAL NIGHTLY PRN
Qty: 30 TABLET | Refills: 0 | Status: SHIPPED | OUTPATIENT
Start: 2024-08-12 | End: 2024-10-11

## 2024-08-12 ASSESSMENT — ENCOUNTER SYMPTOMS
RESPIRATORY NEGATIVE: 1
CARDIOVASCULAR NEGATIVE: 1
MUSCULOSKELETAL NEGATIVE: 1
GASTROINTESTINAL NEGATIVE: 1
CONSTITUTIONAL NEGATIVE: 1

## 2024-08-12 NOTE — ASSESSMENT & PLAN NOTE
Has tried Trazodone, hydroxyzine   Trial Ambien   She understands it is a controlled substance and if effective may need to come in every 3 months.  Will address CSA if she was to continue

## 2024-08-12 NOTE — PROGRESS NOTES
Subjective   Patient ID: Mayra Tillman is a 49 y.o. female who presents for Menopause (Started on Estrodiol at last visit. Patient is concerned about weight gain.).  HPI  Here for follow up  Feels well  No complaints  Taking Wellbutrin - no issues  Stopped her hydroxyzine - was not helping her sleep  Gets up 2-5 times a night  Trazodone caused arthralgias    Review of Systems   Constitutional: Negative.    HENT: Negative.     Respiratory: Negative.     Cardiovascular: Negative.    Gastrointestinal: Negative.    Genitourinary: Negative.    Musculoskeletal: Negative.        Objective   Vitals:    08/12/24 1610   BP: 118/70   Pulse: 78   SpO2: 97%     Physical Exam  Vitals and nursing note reviewed.   Cardiovascular:      Rate and Rhythm: Normal rate and regular rhythm.   Pulmonary:      Effort: Pulmonary effort is normal.      Breath sounds: Normal breath sounds.         Assessment & Plan  Dense breast  Family history of breast cancer and a cousin positive genetic testing  Consider genetic testing  Orders:    MR breast bilateral w IV contrast fast screening self pay; Future    Primary insomnia  Has tried Trazodone, hydroxyzine   Trial Ambien   She understands it is a controlled substance and if effective may need to come in every 3 months.  Will address CSA if she was to continue       Generalized anxiety disorder  Stable       Follow up with me in 3-4 months

## 2024-11-11 ENCOUNTER — APPOINTMENT (OUTPATIENT)
Dept: PRIMARY CARE | Facility: CLINIC | Age: 49
End: 2024-11-11
Payer: COMMERCIAL

## 2024-11-13 ENCOUNTER — TELEPHONE (OUTPATIENT)
Dept: PRIMARY CARE | Facility: CLINIC | Age: 49
End: 2024-11-13
Payer: COMMERCIAL

## 2024-11-20 ENCOUNTER — APPOINTMENT (OUTPATIENT)
Dept: PRIMARY CARE | Facility: CLINIC | Age: 49
End: 2024-11-20
Payer: COMMERCIAL

## 2024-11-20 VITALS
SYSTOLIC BLOOD PRESSURE: 119 MMHG | WEIGHT: 184.8 LBS | HEART RATE: 67 BPM | HEIGHT: 63 IN | TEMPERATURE: 96.6 F | DIASTOLIC BLOOD PRESSURE: 78 MMHG | OXYGEN SATURATION: 98 % | BODY MASS INDEX: 32.74 KG/M2

## 2024-11-20 DIAGNOSIS — Z00.00 ANNUAL PHYSICAL EXAM: ICD-10-CM

## 2024-11-20 DIAGNOSIS — F41.1 GENERALIZED ANXIETY DISORDER: Primary | ICD-10-CM

## 2024-11-20 DIAGNOSIS — F51.01 PRIMARY INSOMNIA: ICD-10-CM

## 2024-11-20 DIAGNOSIS — Z12.31 ENCOUNTER FOR SCREENING MAMMOGRAM FOR MALIGNANT NEOPLASM OF BREAST: ICD-10-CM

## 2024-11-20 DIAGNOSIS — N95.1 MENOPAUSAL SYMPTOM: ICD-10-CM

## 2024-11-20 PROCEDURE — 99396 PREV VISIT EST AGE 40-64: CPT | Performed by: INTERNAL MEDICINE

## 2024-11-20 PROCEDURE — 1036F TOBACCO NON-USER: CPT | Performed by: INTERNAL MEDICINE

## 2024-11-20 PROCEDURE — 3008F BODY MASS INDEX DOCD: CPT | Performed by: INTERNAL MEDICINE

## 2024-11-20 ASSESSMENT — ENCOUNTER SYMPTOMS
VOICE CHANGE: 0
APPETITE CHANGE: 0
HEMATURIA: 0
CHEST TIGHTNESS: 0
NAUSEA: 0
CONSTIPATION: 0
HEADACHES: 0
COUGH: 0
DIZZINESS: 0
LIGHT-HEADEDNESS: 0
ACTIVITY CHANGE: 0
TREMORS: 0
WHEEZING: 0
UNEXPECTED WEIGHT CHANGE: 0
DIARRHEA: 0
VOMITING: 0
TROUBLE SWALLOWING: 0
ARTHRALGIAS: 0
PALPITATIONS: 0
BLOOD IN STOOL: 0
DIFFICULTY URINATING: 0
FATIGUE: 0

## 2024-11-20 ASSESSMENT — PATIENT HEALTH QUESTIONNAIRE - PHQ9
1. LITTLE INTEREST OR PLEASURE IN DOING THINGS: NOT AT ALL
2. FEELING DOWN, DEPRESSED OR HOPELESS: NOT AT ALL
SUM OF ALL RESPONSES TO PHQ9 QUESTIONS 1 AND 2: 0

## 2024-11-20 NOTE — PROGRESS NOTES
Subjective   Patient ID: Mayra Tillman is a 49 y.o. female who presents for Annual Exam.    49 y.o. here for a [facility]/physical  Has been feeling well  No active complaints today  Zolpidem did not really help sleep through the night - only gets four hours      Employment - [employer]  Children - 3 (17,16,14)  Tob - Nonsmoker  Alcohol - rare   Marijuana  - denies  Exercise -  walks    Review of Systems   Constitutional:  Negative for activity change, appetite change, fatigue and unexpected weight change.   HENT:  Negative for ear pain, hearing loss, tinnitus, trouble swallowing and voice change.    Eyes:  Negative for visual disturbance.   Respiratory:  Negative for cough, chest tightness and wheezing.    Cardiovascular:  Negative for chest pain, palpitations and leg swelling.   Gastrointestinal:  Negative for blood in stool, constipation, diarrhea, nausea and vomiting.   Genitourinary:  Negative for difficulty urinating, hematuria and vaginal bleeding.   Musculoskeletal:  Negative for arthralgias.   Skin:  Negative for rash.   Neurological:  Negative for dizziness, tremors, syncope, light-headedness and headaches.       Objective   Vitals:    11/20/24 0827   BP: 119/78   Pulse: 67   Temp: 35.9 °C (96.6 °F)   SpO2: 98%     Physical Exam  Constitutional:       General: She is not in acute distress.     Appearance: She is well-developed. She is not diaphoretic.   HENT:      Head: Normocephalic.      Right Ear: Tympanic membrane normal. There is no impacted cerumen.      Left Ear: Tympanic membrane normal. There is no impacted cerumen.      Nose: Nose normal.      Mouth/Throat:      Mouth: Mucous membranes are moist.      Pharynx: Oropharynx is clear. No oropharyngeal exudate or posterior oropharyngeal erythema.   Eyes:      General: No scleral icterus.     Extraocular Movements: Extraocular movements intact.      Conjunctiva/sclera: Conjunctivae normal.      Pupils: Pupils are equal, round, and reactive to light.    Neck:      Thyroid: No thyromegaly.      Vascular: No JVD.   Cardiovascular:      Rate and Rhythm: Normal rate and regular rhythm.      Pulses: Normal pulses.      Heart sounds: Normal heart sounds. No murmur heard.     No friction rub. No gallop.   Pulmonary:      Effort: Pulmonary effort is normal. No respiratory distress.      Breath sounds: Normal breath sounds. No wheezing or rales.   Chest:      Chest wall: No tenderness.   Abdominal:      General: Bowel sounds are normal. There is no distension.      Palpations: Abdomen is soft. There is no mass.      Tenderness: There is no abdominal tenderness. There is no rebound.   Musculoskeletal:         General: Normal range of motion.      Cervical back: Normal range of motion and neck supple.   Lymphadenopathy:      Cervical: No cervical adenopathy.   Skin:     General: Skin is warm and dry.   Neurological:      General: No focal deficit present.      Mental Status: She is alert and oriented to person, place, and time.      Deep Tendon Reflexes: Reflexes normal.   Psychiatric:         Mood and Affect: Mood normal.         Thought Content: Thought content normal.         Assessment/Plan   Problem List Items Addressed This Visit       Generalized anxiety disorder - Primary    Current Assessment & Plan     Stable         Insomnia    Current Assessment & Plan     Continue ambien prn         Menopausal symptom    Current Assessment & Plan     Improved hot flashes          Other Visit Diagnoses       Encounter for screening mammogram for malignant neoplasm of breast        Relevant Orders    BI mammo bilateral screening tomosynthesis           Follow up with me in 6 months

## 2024-12-15 DIAGNOSIS — F41.9 ANXIETY: ICD-10-CM

## 2024-12-16 RX ORDER — BUPROPION HYDROCHLORIDE 150 MG/1
150 TABLET ORAL DAILY
Qty: 90 TABLET | Refills: 1 | Status: SHIPPED | OUTPATIENT
Start: 2024-12-16

## 2024-12-18 ENCOUNTER — APPOINTMENT (OUTPATIENT)
Dept: RADIOLOGY | Facility: CLINIC | Age: 49
End: 2024-12-18
Payer: COMMERCIAL

## 2024-12-20 ENCOUNTER — HOSPITAL ENCOUNTER (OUTPATIENT)
Dept: RADIOLOGY | Facility: CLINIC | Age: 49
Discharge: HOME | End: 2024-12-20
Payer: COMMERCIAL

## 2024-12-20 DIAGNOSIS — R92.30 DENSE BREAST: ICD-10-CM

## 2024-12-20 DIAGNOSIS — R92.2 DENSE BREAST: ICD-10-CM

## 2024-12-20 PROCEDURE — A9575 INJ GADOTERATE MEGLUMI 0.1ML: HCPCS | Performed by: INTERNAL MEDICINE

## 2024-12-20 PROCEDURE — 2550000001 HC RX 255 CONTRASTS: Performed by: INTERNAL MEDICINE

## 2024-12-20 PROCEDURE — 6100000003 BI MR BREAST BILATERAL WITH CONTRAST FAST SCREENING SELF PAY

## 2024-12-20 RX ORDER — GADOTERATE MEGLUMINE 376.9 MG/ML
17 INJECTION INTRAVENOUS
Status: COMPLETED | OUTPATIENT
Start: 2024-12-20 | End: 2024-12-20

## 2025-01-23 DIAGNOSIS — F51.01 PRIMARY INSOMNIA: ICD-10-CM

## 2025-01-23 RX ORDER — ZOLPIDEM TARTRATE 5 MG/1
5 TABLET ORAL NIGHTLY PRN
Qty: 30 TABLET | Refills: 0 | Status: SHIPPED | OUTPATIENT
Start: 2025-01-23 | End: 2025-03-24

## 2025-02-17 DIAGNOSIS — F41.9 ANXIETY: ICD-10-CM

## 2025-02-17 RX ORDER — BUPROPION HYDROCHLORIDE 150 MG/1
150 TABLET ORAL DAILY
Qty: 90 TABLET | Refills: 3 | Status: SHIPPED | OUTPATIENT
Start: 2025-02-17

## 2025-03-17 ENCOUNTER — APPOINTMENT (OUTPATIENT)
Dept: RADIOLOGY | Facility: CLINIC | Age: 50
End: 2025-03-17
Payer: COMMERCIAL

## 2025-04-11 DIAGNOSIS — F41.1 GENERALIZED ANXIETY DISORDER: Primary | ICD-10-CM

## 2025-04-11 RX ORDER — BUPROPION HYDROCHLORIDE 300 MG/1
300 TABLET ORAL EVERY MORNING
Qty: 30 TABLET | Refills: 1 | Status: SHIPPED | OUTPATIENT
Start: 2025-04-11 | End: 2025-06-10

## 2025-04-12 ENCOUNTER — OFFICE VISIT (OUTPATIENT)
Dept: URGENT CARE | Age: 50
End: 2025-04-12
Payer: COMMERCIAL

## 2025-04-12 VITALS
OXYGEN SATURATION: 99 % | BODY MASS INDEX: 32.6 KG/M2 | RESPIRATION RATE: 17 BRPM | HEART RATE: 82 BPM | SYSTOLIC BLOOD PRESSURE: 126 MMHG | HEIGHT: 63 IN | TEMPERATURE: 98 F | WEIGHT: 184 LBS | DIASTOLIC BLOOD PRESSURE: 71 MMHG

## 2025-04-12 DIAGNOSIS — R05.2 SUBACUTE COUGH: Primary | ICD-10-CM

## 2025-04-12 DIAGNOSIS — R09.82 POST-NASAL DRIP: ICD-10-CM

## 2025-04-12 DIAGNOSIS — R03.0 ELEVATED BLOOD PRESSURE READING: ICD-10-CM

## 2025-04-12 RX ORDER — FLUTICASONE PROPIONATE 50 MCG
1 SPRAY, SUSPENSION (ML) NASAL DAILY
Qty: 16 G | Refills: 0 | Status: SHIPPED | OUTPATIENT
Start: 2025-04-12 | End: 2026-04-12

## 2025-04-12 ASSESSMENT — ENCOUNTER SYMPTOMS
SHORTNESS OF BREATH: 0
FEVER: 0
WHEEZING: 0
CHILLS: 0
NEUROLOGICAL NEGATIVE: 1
COUGH: 1

## 2025-04-12 NOTE — PATIENT INSTRUCTIONS
Flonase can be use 1-2 sprays each nostril daily.   You can take a daily non-drowsy allergy medication such as Allergra (Fexofendadine), Zyrtec (Cetirizine), or Claritin (Loratadine)     Sometimes cough can be due to acid reflux.  You can try pepcid or nexium (PPI) over the counter.     Tricks to manage your symptoms:   -Drink plenty of water to stay hydrated.  -Ibuprofen/NSAIDS (Advil® or Motrin®) or acetaminophen (Tylenol®) may be used for temperature and/or discomfort.  Do not exceed the recommended daily amount as written on the bottle. Do not take ibuprofen or other NSAIDS if you are on a blood thinner, have hypertension, hear failure, or kidney disease..  Please contact your PCP if you have questions..  -You can take a daily non-drowsy allergy medication such as Allergra (Fexofendadine), Zyrtec (Cetirizine) or Claritin (Loratadine) to decrease sinus and nasal inflammation and drainage.  -If you have elevated blood pressure you should avoid products with products that contain phenylephrine or pseudophedrine as these medications can increase your blood pressure.  -Putting a small amount of honey in tea may help with your sore throat and cough.  -Gargle with warm salt water 3 to 4 times each day. Mix 1/2 teaspoon (2.5 grams) salt with a cup (240 mL) of warm water.     -Nasal steroids spray (fluticasone or generic equivalent) 1-2 times daily as needed  -Saline nasal spray  -You can take OTC mucinex (Guaifenesin): Can take up to 1200 mg twice daily.    -Drink plenty of water. Water helps thin the mucus so your sinuses can drain more easily.   -Use a humidifier.  -inhale steam 3 to 4 times a day (for example, sit in the bathroom with the shower running).  -Apply a warm, moist washcloth to your face 3 to 4 times a day, or as directed by your caregiver.    Please seek immediate medical evaluation if you develop:   -Shortness of breath or difficulty breathing  -Trouble swallowing, trouble breathing, or shortness of  breath while lying down  -You are unable to take a deep breath  -You have difficulties swallowing  -You have chest pain   -You have heart palpitations  -You have fever > 102 F despite fever reducing medications   -You are unable to tolerate fluids for 6 hours or more  -You develop swelling and/or pain in your legs   -You feel faint, lightheaded, or dizzy  -Any other concerning symptoms    Follow up with PCP for elevated blood pressure.

## 2025-04-12 NOTE — PROGRESS NOTES
Subjective   Patient ID: Mayra Tillman is a 50 y.o. female. They present today with a chief complaint of Cough (3-4 wks).    History of Present Illness  -c/o cough for the last 3-4 weeks  -initially had URI with body aches, congestion, sore throat  -now just lingering cough, which has been intermittent  -cough is dry, usually worse at night  -she does have post nasal drip      Cough  Pertinent negatives include no chills, fever, shortness of breath or wheezing.       Past Medical History  Allergies as of 2025 - Reviewed 2025   Allergen Reaction Noted    Sertraline Other and Nausea Only 2023    Latex Rash 2023    Sulfa (sulfonamide antibiotics) Rash 2023       (Not in a hospital admission)       Past Medical History:   Diagnosis Date    Abnormal uterine and vaginal bleeding, unspecified 2015    Abnormal uterine bleeding    Anxiety disorder, unspecified     Anxiety    Other conditions influencing health status     Nephrolithiasis    Personal history of urinary (tract) infections 2014    History of urinary tract infection       Past Surgical History:   Procedure Laterality Date     SECTION, CLASSIC  2014     Section    HYSTERECTOMY  2015    Hysterectomy    OTHER SURGICAL HISTORY  2015    Laparoscopy With Salpingostomy    OTHER SURGICAL HISTORY  2015    Therapeutic Cystoscopy        reports that she quit smoking about 22 years ago. Her smoking use included cigarettes. She has never used smokeless tobacco. She reports current alcohol use. She reports that she does not use drugs.    Review of Systems  Review of Systems   Constitutional:  Negative for chills and fever.   HENT: Negative.     Respiratory:  Positive for cough. Negative for shortness of breath and wheezing.    Neurological: Negative.    All other systems reviewed and are negative.      Objective    Vitals:    25 0957   BP: 126/71   Pulse: 82   Resp: 17   Temp: 36.7 °C  "(98 °F)   TempSrc: Oral   SpO2: 99%   Weight: 83.5 kg (184 lb)   Height: 1.6 m (5' 3\")     No LMP recorded. Patient is postmenopausal.    Physical Exam  Vitals reviewed.   Constitutional:       General: She is not in acute distress.     Appearance: Normal appearance. She is not ill-appearing.   HENT:      Head: Normocephalic and atraumatic.      Right Ear: Tympanic membrane and ear canal normal.      Left Ear: Tympanic membrane and ear canal normal.      Nose: Nose normal.      Mouth/Throat:      Mouth: Mucous membranes are moist.      Pharynx: Oropharynx is clear.   Eyes:      Extraocular Movements: Extraocular movements intact.      Pupils: Pupils are equal, round, and reactive to light.   Cardiovascular:      Rate and Rhythm: Normal rate and regular rhythm.      Pulses: Normal pulses.      Heart sounds: No murmur heard.     No friction rub. No gallop.   Pulmonary:      Effort: Pulmonary effort is normal. No respiratory distress.      Breath sounds: Normal breath sounds. No stridor. No wheezing, rhonchi or rales.   Musculoskeletal:      Cervical back: No rigidity or tenderness.   Neurological:      Mental Status: She is alert.       /71   Pulse 82   Temp 36.7 °C (98 °F) (Oral)   Resp 17   Ht 1.6 m (5' 3\")   Wt 83.5 kg (184 lb)   SpO2 99%   BMI 32.59 kg/m²       Procedures    Point of Care Test & Imaging Results from this visit  No results found for this visit on 04/12/25.   Imaging  No results found.    Cardiology, Vascular, and Other Imaging  No other imaging results found for the past 2 days      Diagnostic study results (if any) were reviewed by Avelina Bob PA-C.    Assessment/Plan   Allergies, medications, history, and pertinent labs/EKGs/Imaging reviewed by Avelina Bob PA-C.     Medical Decision Making  Patient has had non-productive cough for the last 3-4 weeks.  Started with URI, other symptoms resolved, but has lingering cough.  She is HDS, afebrile, no tachycardia, tachypnea, or " hypoxia.  On exam, lungs are clear.  She has no signs of bacterial sinusitis, otitis, or pneumonia.  She has post nasal drip, which might be the cause of her intermittent cough.  She has history of allergic rhinitis.  Allergies may be contributing factor to intermittent cough.  Discussed with patient no evidence of bacterial infection, and would not recommended antibiotics at this time.  Ddx post-viral cough, post-nasal drip, allergic rhinitis, and/or silent reflux.   Recommended symptom management with flonase, non-drowsy antihistamine, salt water gargles, and anti-tussives.  Discussed trial of PPI/H2 blocker for possible silent GERD.  Patient verbalized understanding of plan.  Advised to return to clinic for new/worsening symptoms, or follow up with PCP for persistent symptoms.      Given overall well appearance, vital signs, history, and exam as above, there is no indication for further emergent testing/intervention at this time.  Advised seeking immediate emergency medical attention if symptoms fail to improve, worsen or any concerning symptoms arise. Patient voiced full understanding and agreement to plan. Discussed with the patient our clinical thoughts at this time given the above findings and clinical assessment and we had a shared decision-making conversation in a patient-centered decision-making model on how to proceed forward. The patient was instructed on the importance of a close follow-up with PCP and other care providers. The patient was also advised that an Urgent care diagnosis is often a preliminary impression and that definitive care is often not able to be given completley in the Urgent care setting.  Advised to follow up with PCP for elevated blood pressure.     Orders and Diagnoses  Diagnoses and all orders for this visit:  Subacute cough  -     fluticasone (Flonase) 50 mcg/actuation nasal spray; Administer 1 spray into each nostril once daily. Shake gently. Before first use, prime pump. After  use, clean tip and replace cap.  Post-nasal drip  -     fluticasone (Flonase) 50 mcg/actuation nasal spray; Administer 1 spray into each nostril once daily. Shake gently. Before first use, prime pump. After use, clean tip and replace cap.  Elevated blood pressure reading      Medical Admin Record      Patient disposition: Home    Electronically signed by Avelina Bob PA-C  11:28 AM

## 2025-05-12 DIAGNOSIS — F41.1 GENERALIZED ANXIETY DISORDER: ICD-10-CM

## 2025-05-12 RX ORDER — BUPROPION HYDROCHLORIDE 300 MG/1
300 TABLET ORAL EVERY MORNING
Qty: 90 TABLET | Refills: 0 | Status: SHIPPED | OUTPATIENT
Start: 2025-05-12 | End: 2025-08-10

## 2025-07-02 ENCOUNTER — APPOINTMENT (OUTPATIENT)
Dept: PRIMARY CARE | Facility: CLINIC | Age: 50
End: 2025-07-02
Payer: COMMERCIAL

## 2025-07-24 DIAGNOSIS — F41.1 GENERALIZED ANXIETY DISORDER: ICD-10-CM

## 2025-07-24 RX ORDER — BUPROPION HYDROCHLORIDE 300 MG/1
300 TABLET ORAL EVERY MORNING
Qty: 90 TABLET | Refills: 0 | Status: SHIPPED | OUTPATIENT
Start: 2025-07-24

## 2025-08-07 ENCOUNTER — APPOINTMENT (OUTPATIENT)
Dept: PRIMARY CARE | Facility: CLINIC | Age: 50
End: 2025-08-07
Payer: COMMERCIAL

## 2025-08-07 VITALS
DIASTOLIC BLOOD PRESSURE: 81 MMHG | BODY MASS INDEX: 33.03 KG/M2 | HEART RATE: 72 BPM | OXYGEN SATURATION: 98 % | HEIGHT: 63 IN | SYSTOLIC BLOOD PRESSURE: 120 MMHG | WEIGHT: 186.4 LBS | TEMPERATURE: 96.7 F

## 2025-08-07 DIAGNOSIS — N95.1 MENOPAUSAL SYMPTOM: Primary | ICD-10-CM

## 2025-08-07 DIAGNOSIS — F41.1 GENERALIZED ANXIETY DISORDER: ICD-10-CM

## 2025-08-07 DIAGNOSIS — Z00.00 ROUTINE GENERAL MEDICAL EXAMINATION AT A HEALTH CARE FACILITY: ICD-10-CM

## 2025-08-07 DIAGNOSIS — F51.01 PRIMARY INSOMNIA: ICD-10-CM

## 2025-08-07 RX ORDER — ZOLPIDEM TARTRATE 5 MG/1
5 TABLET ORAL NIGHTLY PRN
Qty: 30 TABLET | Refills: 0 | Status: SHIPPED | OUTPATIENT
Start: 2025-08-07 | End: 2025-10-06

## 2025-08-07 RX ORDER — ESTRADIOL 1 MG/1
1 TABLET ORAL DAILY
Qty: 90 TABLET | Refills: 3 | Status: SHIPPED | OUTPATIENT
Start: 2025-08-07 | End: 2025-11-05

## 2025-08-07 RX ORDER — HYDROXYZINE HYDROCHLORIDE 10 MG/1
10 TABLET, FILM COATED ORAL EVERY 6 HOURS PRN
Qty: 90 TABLET | Refills: 0 | Status: SHIPPED | OUTPATIENT
Start: 2025-08-07 | End: 2025-11-05

## 2025-08-07 ASSESSMENT — PATIENT HEALTH QUESTIONNAIRE - PHQ9
SUM OF ALL RESPONSES TO PHQ9 QUESTIONS 1 AND 2: 0
2. FEELING DOWN, DEPRESSED OR HOPELESS: NOT AT ALL
1. LITTLE INTEREST OR PLEASURE IN DOING THINGS: NOT AT ALL

## 2025-08-07 NOTE — PROGRESS NOTES
"Subjective   Patient ID: Mayra Tillman is a 50 y.o. female who presents for Follow-up.    HPI   49 yo F presents for follow-up. States she has been doing okay overall. She is taking estrogen and it initially helped with her hot flashes and brain fog, but in the last 4-6 weeks these sxs have return, albeit less intense. She also states she is doing well on a day to day basis on the increased dose of Wellbutrin, but is nervous about her daughter starting college soon and would like to have something she can take as needed. Continues to have difficulty sleeping, so takes an Ambien 1-2 times per week, which helps.     Review of Systems  Pertinent ROS as above    Objective   /81   Pulse 72   Temp 35.9 °C (96.7 °F)   Ht 1.6 m (5' 3\")   Wt 84.6 kg (186 lb 6.4 oz)   SpO2 98%   BMI 33.02 kg/m²     Physical Exam  Constitutional: NAD, pleasant  HEENT: EOMI, no scleral icterus, MMM  CV: RRR, no m/r/g  Pulm: CTAB, no increased WOB  GI: Soft, NT, ND  Extremities: no notable edema  Skin: warm  Neuro: grossly alert and oriented  Psych: Mood and affect appropriate to situation     Assessment/Plan   49 yo F presents for follow-up.     #Anxiety  -continue Wellbutrin 300 mg daily  -start hydroxyzine 10mg q6h prn    #Insomnia  -continue as needed Ambien 1-2 times per week  -continue melatonin  -can take a hydroxyzine before beds on days when she is not taking Ambien    #Menopause   -increase estrogen to 1mg daily    Health Maintenace:  -due for mamogram in 12/25  -labs to be done prior to next appt  -RTC in January for physical  "

## 2026-02-11 ENCOUNTER — APPOINTMENT (OUTPATIENT)
Dept: PRIMARY CARE | Facility: CLINIC | Age: 51
End: 2026-02-11
Payer: COMMERCIAL